# Patient Record
(demographics unavailable — no encounter records)

---

## 2020-06-19 DIAGNOSIS — Z11.59 SPECIAL SCREENING EXAMINATION FOR VIRAL DISEASE: Primary | ICD-10-CM

## 2020-06-20 DIAGNOSIS — Z11.59 SPECIAL SCREENING EXAMINATION FOR VIRAL DISEASE: ICD-10-CM

## 2020-06-20 NOTE — LETTER
Your test was negative for coronavirus which is the virus which causes COVID-19.  This is good news!      June 23, 2020        Ivanna Duffy  [address] [facility] [id number]          Dear Ivanna Duffy,    We are writing to inform you of your test results.    Your test was negative for coronavirus which is the virus which causes COVID-19.  This is good news!    Resulted Orders   COVID-19 Virus PCR [facility] ([facility])   Result Value Ref Range    COVID-19 Virus PCR to [facility] - Source Nasopharyngeal     COVID-19 Virus PCR to [facility] - Result Not Detected       Comment:      Collection of multiple specimens from the same patient may be necessary to  detect the virus. The possibility of a false negative should be considered if  the patient's recent exposure or clinical presentation suggests 2019 nCOV  infection and diagnostic tests for other causes of illness are negative.   Repeat  testing may be considered in this setting.  Viral RNA was extracted via a validated method and subsequently underwent  single step reverse transcriptase-real time polymerase chain reaction using  primers to the CDC specified N1,N2 gene targets of CoV2 and human RNP as an  internal control.  A negative result does not rule out the presence of real-time PCR inhibitors   in  the specimen or COVID-19 RNA in concentrations below the limit of detection of  the assay. The possibility of a false negative should be considered if the  patients recent exposure or clinical presentation suggests COVID-19.   Additional  testing or repeat testing requires consultation with the laboratory.  [place]  opharyngeal specimen is the preferred choice for swab-based SARS CoV2  testing. When collection of a nasopharyngeal swab is not possible the   following  are acceptable alternatives:  an oropharyngeal (OP) specimen collected by a healthcare professional, or a  nasal mid-turbinate (NMT) swab collected by a healthcare professional or by  onsite  self-collection (using a flocked tapered swab), or an anterior nares  specimen collected by a healthcare professional or by onsite self-collection  (using a round foam swab). (Centers for Disease Control)  Testing performed by [facility], Room 1-210, [address]  [address] [place], [place] [id number]. This test was developed and its  performance characteristics determined by the [facility]. It has not been cleared or approved by the FDA.  The laboratory is regulated under the Clinical Laboratory Improvement  Amendments of 1988 (CLIA-88) as qualified to perform high-complexity testing.  This test i  s used for clinical purposes. It should not be regarded as  investigational or for research.  Performed and/or entered by:  [address] [address]       Narrative    Test performed by:  [facility]  [address], [place] [address]       If you have any questions or concerns, please call the clinic at the number listed above.       Sincerely,      [facility]

## 2020-06-21 LAB
COVID-19 VIRUS PCR TO U OF MN - SOURCE: NORMAL
SARS-COV-2 RNA SPEC QL NAA+PROBE: NOT DETECTED